# Patient Record
Sex: FEMALE | Race: WHITE | NOT HISPANIC OR LATINO | URBAN - METROPOLITAN AREA
[De-identification: names, ages, dates, MRNs, and addresses within clinical notes are randomized per-mention and may not be internally consistent; named-entity substitution may affect disease eponyms.]

---

## 2017-10-12 ENCOUNTER — OUTPATIENT (OUTPATIENT)
Dept: OUTPATIENT SERVICES | Facility: HOSPITAL | Age: 72
LOS: 1 days | Discharge: HOME | End: 2017-10-12

## 2017-10-12 DIAGNOSIS — Z12.31 ENCOUNTER FOR SCREENING MAMMOGRAM FOR MALIGNANT NEOPLASM OF BREAST: ICD-10-CM

## 2018-10-17 ENCOUNTER — OUTPATIENT (OUTPATIENT)
Dept: OUTPATIENT SERVICES | Facility: HOSPITAL | Age: 73
LOS: 1 days | Discharge: HOME | End: 2018-10-17

## 2019-03-27 ENCOUNTER — OUTPATIENT (OUTPATIENT)
Dept: OUTPATIENT SERVICES | Facility: HOSPITAL | Age: 74
LOS: 1 days | Discharge: HOME | End: 2019-03-27

## 2019-03-27 DIAGNOSIS — R52 PAIN, UNSPECIFIED: ICD-10-CM

## 2019-04-02 ENCOUNTER — OUTPATIENT (OUTPATIENT)
Dept: OUTPATIENT SERVICES | Facility: HOSPITAL | Age: 74
LOS: 1 days | Discharge: HOME | End: 2019-04-02
Payer: MEDICARE

## 2019-04-02 DIAGNOSIS — M54.2 CERVICALGIA: ICD-10-CM

## 2019-04-02 PROCEDURE — 72141 MRI NECK SPINE W/O DYE: CPT | Mod: 26

## 2019-10-21 ENCOUNTER — OUTPATIENT (OUTPATIENT)
Dept: OUTPATIENT SERVICES | Facility: HOSPITAL | Age: 74
LOS: 1 days | Discharge: HOME | End: 2019-10-21
Payer: MEDICARE

## 2019-10-21 DIAGNOSIS — Z12.31 ENCOUNTER FOR SCREENING MAMMOGRAM FOR MALIGNANT NEOPLASM OF BREAST: ICD-10-CM

## 2019-10-21 PROCEDURE — 77063 BREAST TOMOSYNTHESIS BI: CPT | Mod: 26

## 2019-10-21 PROCEDURE — 77067 SCR MAMMO BI INCL CAD: CPT | Mod: 26

## 2021-03-31 ENCOUNTER — INPATIENT (INPATIENT)
Facility: HOSPITAL | Age: 76
LOS: 3 days | Discharge: HOME HEALTH | End: 2021-04-04
Attending: STUDENT IN AN ORGANIZED HEALTH CARE EDUCATION/TRAINING PROGRAM | Admitting: STUDENT IN AN ORGANIZED HEALTH CARE EDUCATION/TRAINING PROGRAM
Payer: MEDICARE

## 2021-03-31 VITALS
RESPIRATION RATE: 17 BRPM | DIASTOLIC BLOOD PRESSURE: 79 MMHG | OXYGEN SATURATION: 99 % | SYSTOLIC BLOOD PRESSURE: 132 MMHG | TEMPERATURE: 97 F | WEIGHT: 115.96 LBS | HEART RATE: 78 BPM

## 2021-03-31 DIAGNOSIS — Z90.49 ACQUIRED ABSENCE OF OTHER SPECIFIED PARTS OF DIGESTIVE TRACT: Chronic | ICD-10-CM

## 2021-03-31 DIAGNOSIS — Z90.710 ACQUIRED ABSENCE OF BOTH CERVIX AND UTERUS: Chronic | ICD-10-CM

## 2021-03-31 LAB
ALBUMIN SERPL ELPH-MCNC: 4.7 G/DL — SIGNIFICANT CHANGE UP (ref 3.5–5.2)
ALP SERPL-CCNC: 57 U/L — SIGNIFICANT CHANGE UP (ref 30–115)
ALT FLD-CCNC: 18 U/L — SIGNIFICANT CHANGE UP (ref 0–41)
ANION GAP SERPL CALC-SCNC: 12 MMOL/L — SIGNIFICANT CHANGE UP (ref 7–14)
ANION GAP SERPL CALC-SCNC: 13 MMOL/L — SIGNIFICANT CHANGE UP (ref 7–14)
APPEARANCE UR: CLEAR — SIGNIFICANT CHANGE UP
APTT BLD: 27.9 SEC — SIGNIFICANT CHANGE UP (ref 27–39.2)
APTT BLD: 29.8 SEC — SIGNIFICANT CHANGE UP (ref 27–39.2)
AST SERPL-CCNC: 32 U/L — SIGNIFICANT CHANGE UP (ref 0–41)
BASOPHILS # BLD AUTO: 0.02 K/UL — SIGNIFICANT CHANGE UP (ref 0–0.2)
BASOPHILS NFR BLD AUTO: 0.2 % — SIGNIFICANT CHANGE UP (ref 0–1)
BILIRUB SERPL-MCNC: 1 MG/DL — SIGNIFICANT CHANGE UP (ref 0.2–1.2)
BILIRUB UR-MCNC: NEGATIVE — SIGNIFICANT CHANGE UP
BLD GP AB SCN SERPL QL: SIGNIFICANT CHANGE UP
BUN SERPL-MCNC: 16 MG/DL — SIGNIFICANT CHANGE UP (ref 10–20)
BUN SERPL-MCNC: 18 MG/DL — SIGNIFICANT CHANGE UP (ref 10–20)
CALCIUM SERPL-MCNC: 10.2 MG/DL — HIGH (ref 8.5–10.1)
CALCIUM SERPL-MCNC: 9.5 MG/DL — SIGNIFICANT CHANGE UP (ref 8.5–10.1)
CHLORIDE SERPL-SCNC: 92 MMOL/L — LOW (ref 98–110)
CHLORIDE SERPL-SCNC: 96 MMOL/L — LOW (ref 98–110)
CO2 SERPL-SCNC: 25 MMOL/L — SIGNIFICANT CHANGE UP (ref 17–32)
CO2 SERPL-SCNC: 27 MMOL/L — SIGNIFICANT CHANGE UP (ref 17–32)
COLOR SPEC: SIGNIFICANT CHANGE UP
CREAT SERPL-MCNC: 0.8 MG/DL — SIGNIFICANT CHANGE UP (ref 0.7–1.5)
CREAT SERPL-MCNC: 0.9 MG/DL — SIGNIFICANT CHANGE UP (ref 0.7–1.5)
DIFF PNL FLD: SIGNIFICANT CHANGE UP
EOSINOPHIL # BLD AUTO: 0 K/UL — SIGNIFICANT CHANGE UP (ref 0–0.7)
EOSINOPHIL NFR BLD AUTO: 0 % — SIGNIFICANT CHANGE UP (ref 0–8)
GLUCOSE SERPL-MCNC: 108 MG/DL — HIGH (ref 70–99)
GLUCOSE SERPL-MCNC: 118 MG/DL — HIGH (ref 70–99)
GLUCOSE UR QL: NEGATIVE — SIGNIFICANT CHANGE UP
HCT VFR BLD CALC: 41.3 % — SIGNIFICANT CHANGE UP (ref 37–47)
HCT VFR BLD CALC: 43.7 % — SIGNIFICANT CHANGE UP (ref 37–47)
HGB BLD-MCNC: 14.2 G/DL — SIGNIFICANT CHANGE UP (ref 12–16)
HGB BLD-MCNC: 14.9 G/DL — SIGNIFICANT CHANGE UP (ref 12–16)
IMM GRANULOCYTES NFR BLD AUTO: 0.3 % — SIGNIFICANT CHANGE UP (ref 0.1–0.3)
INR BLD: 1.04 RATIO — SIGNIFICANT CHANGE UP (ref 0.65–1.3)
INR BLD: 1.08 RATIO — SIGNIFICANT CHANGE UP (ref 0.65–1.3)
KETONES UR-MCNC: SIGNIFICANT CHANGE UP
LACTATE SERPL-SCNC: 1.7 MMOL/L — SIGNIFICANT CHANGE UP (ref 0.7–2)
LACTATE SERPL-SCNC: 1.9 MMOL/L — SIGNIFICANT CHANGE UP (ref 0.7–2)
LEUKOCYTE ESTERASE UR-ACNC: NEGATIVE — SIGNIFICANT CHANGE UP
LIDOCAIN IGE QN: 19 U/L — SIGNIFICANT CHANGE UP (ref 7–60)
LYMPHOCYTES # BLD AUTO: 1.08 K/UL — LOW (ref 1.2–3.4)
LYMPHOCYTES # BLD AUTO: 10.1 % — LOW (ref 20.5–51.1)
MAGNESIUM SERPL-MCNC: 1.7 MG/DL — LOW (ref 1.8–2.4)
MCHC RBC-ENTMCNC: 31.6 PG — HIGH (ref 27–31)
MCHC RBC-ENTMCNC: 32 PG — HIGH (ref 27–31)
MCHC RBC-ENTMCNC: 34.1 G/DL — SIGNIFICANT CHANGE UP (ref 32–37)
MCHC RBC-ENTMCNC: 34.4 G/DL — SIGNIFICANT CHANGE UP (ref 32–37)
MCV RBC AUTO: 92.6 FL — SIGNIFICANT CHANGE UP (ref 81–99)
MCV RBC AUTO: 93 FL — SIGNIFICANT CHANGE UP (ref 81–99)
MONOCYTES # BLD AUTO: 0.62 K/UL — HIGH (ref 0.1–0.6)
MONOCYTES NFR BLD AUTO: 5.8 % — SIGNIFICANT CHANGE UP (ref 1.7–9.3)
NEUTROPHILS # BLD AUTO: 8.96 K/UL — HIGH (ref 1.4–6.5)
NEUTROPHILS NFR BLD AUTO: 83.6 % — HIGH (ref 42.2–75.2)
NITRITE UR-MCNC: NEGATIVE — SIGNIFICANT CHANGE UP
NRBC # BLD: 0 /100 WBCS — SIGNIFICANT CHANGE UP (ref 0–0)
NRBC # BLD: 0 /100 WBCS — SIGNIFICANT CHANGE UP (ref 0–0)
PH UR: 6.5 — SIGNIFICANT CHANGE UP (ref 5–8)
PHOSPHATE SERPL-MCNC: 3.8 MG/DL — SIGNIFICANT CHANGE UP (ref 2.1–4.9)
PLATELET # BLD AUTO: 280 K/UL — SIGNIFICANT CHANGE UP (ref 130–400)
PLATELET # BLD AUTO: 300 K/UL — SIGNIFICANT CHANGE UP (ref 130–400)
POTASSIUM SERPL-MCNC: 4.4 MMOL/L — SIGNIFICANT CHANGE UP (ref 3.5–5)
POTASSIUM SERPL-MCNC: 4.8 MMOL/L — SIGNIFICANT CHANGE UP (ref 3.5–5)
POTASSIUM SERPL-SCNC: 4.4 MMOL/L — SIGNIFICANT CHANGE UP (ref 3.5–5)
POTASSIUM SERPL-SCNC: 4.8 MMOL/L — SIGNIFICANT CHANGE UP (ref 3.5–5)
PROT SERPL-MCNC: 7.5 G/DL — SIGNIFICANT CHANGE UP (ref 6–8)
PROT UR-MCNC: NEGATIVE — SIGNIFICANT CHANGE UP
PROTHROM AB SERPL-ACNC: 12 SEC — SIGNIFICANT CHANGE UP (ref 9.95–12.87)
PROTHROM AB SERPL-ACNC: 12.4 SEC — SIGNIFICANT CHANGE UP (ref 9.95–12.87)
RBC # BLD: 4.44 M/UL — SIGNIFICANT CHANGE UP (ref 4.2–5.4)
RBC # BLD: 4.72 M/UL — SIGNIFICANT CHANGE UP (ref 4.2–5.4)
RBC # FLD: 12 % — SIGNIFICANT CHANGE UP (ref 11.5–14.5)
RBC # FLD: 12.2 % — SIGNIFICANT CHANGE UP (ref 11.5–14.5)
SARS-COV-2 RNA SPEC QL NAA+PROBE: SIGNIFICANT CHANGE UP
SODIUM SERPL-SCNC: 131 MMOL/L — LOW (ref 135–146)
SODIUM SERPL-SCNC: 134 MMOL/L — LOW (ref 135–146)
SP GR SPEC: 1.01 — SIGNIFICANT CHANGE UP (ref 1.01–1.03)
UROBILINOGEN FLD QL: SIGNIFICANT CHANGE UP
WBC # BLD: 10.71 K/UL — SIGNIFICANT CHANGE UP (ref 4.8–10.8)
WBC # BLD: 9.98 K/UL — SIGNIFICANT CHANGE UP (ref 4.8–10.8)
WBC # FLD AUTO: 10.71 K/UL — SIGNIFICANT CHANGE UP (ref 4.8–10.8)
WBC # FLD AUTO: 9.98 K/UL — SIGNIFICANT CHANGE UP (ref 4.8–10.8)

## 2021-03-31 PROCEDURE — 71045 X-RAY EXAM CHEST 1 VIEW: CPT | Mod: 26,77

## 2021-03-31 PROCEDURE — 99024 POSTOP FOLLOW-UP VISIT: CPT

## 2021-03-31 PROCEDURE — 71045 X-RAY EXAM CHEST 1 VIEW: CPT | Mod: 26

## 2021-03-31 PROCEDURE — 99285 EMERGENCY DEPT VISIT HI MDM: CPT | Mod: CS

## 2021-03-31 PROCEDURE — 99223 1ST HOSP IP/OBS HIGH 75: CPT | Mod: 57

## 2021-03-31 PROCEDURE — 44005 FREEING OF BOWEL ADHESION: CPT

## 2021-03-31 PROCEDURE — 74177 CT ABD & PELVIS W/CONTRAST: CPT | Mod: 26,MH

## 2021-03-31 PROCEDURE — 93010 ELECTROCARDIOGRAM REPORT: CPT

## 2021-03-31 RX ORDER — BENZOCAINE 10 %
1 GEL (GRAM) MUCOUS MEMBRANE EVERY 6 HOURS
Refills: 0 | Status: DISCONTINUED | OUTPATIENT
Start: 2021-03-31 | End: 2021-04-04

## 2021-03-31 RX ORDER — SODIUM CHLORIDE 9 MG/ML
1000 INJECTION INTRAMUSCULAR; INTRAVENOUS; SUBCUTANEOUS
Refills: 0 | Status: DISCONTINUED | OUTPATIENT
Start: 2021-03-31 | End: 2021-04-02

## 2021-03-31 RX ORDER — LEVOTHYROXINE SODIUM 125 MCG
0 TABLET ORAL
Qty: 0 | Refills: 0 | DISCHARGE

## 2021-03-31 RX ORDER — ONDANSETRON 8 MG/1
4 TABLET, FILM COATED ORAL ONCE
Refills: 0 | Status: COMPLETED | OUTPATIENT
Start: 2021-03-31 | End: 2021-03-31

## 2021-03-31 RX ORDER — KETOROLAC TROMETHAMINE 30 MG/ML
15 SYRINGE (ML) INJECTION EVERY 8 HOURS
Refills: 0 | Status: DISCONTINUED | OUTPATIENT
Start: 2021-03-31 | End: 2021-04-04

## 2021-03-31 RX ORDER — ACETAMINOPHEN 500 MG
1000 TABLET ORAL ONCE
Refills: 0 | Status: COMPLETED | OUTPATIENT
Start: 2021-03-31 | End: 2021-04-01

## 2021-03-31 RX ORDER — LEVOTHYROXINE SODIUM 125 MCG
44 TABLET ORAL AT BEDTIME
Refills: 0 | Status: DISCONTINUED | OUTPATIENT
Start: 2021-03-31 | End: 2021-04-03

## 2021-03-31 RX ORDER — IOHEXOL 300 MG/ML
30 INJECTION, SOLUTION INTRAVENOUS ONCE
Refills: 0 | Status: COMPLETED | OUTPATIENT
Start: 2021-03-31 | End: 2021-03-31

## 2021-03-31 RX ORDER — SODIUM CHLORIDE 9 MG/ML
1000 INJECTION, SOLUTION INTRAVENOUS ONCE
Refills: 0 | Status: COMPLETED | OUTPATIENT
Start: 2021-03-31 | End: 2021-03-31

## 2021-03-31 RX ORDER — PANTOPRAZOLE SODIUM 20 MG/1
40 TABLET, DELAYED RELEASE ORAL DAILY
Refills: 0 | Status: DISCONTINUED | OUTPATIENT
Start: 2021-03-31 | End: 2021-04-04

## 2021-03-31 RX ORDER — BENZOCAINE 10 %
1 GEL (GRAM) MUCOUS MEMBRANE EVERY 6 HOURS
Refills: 0 | Status: DISCONTINUED | OUTPATIENT
Start: 2021-03-31 | End: 2021-03-31

## 2021-03-31 RX ORDER — HEPARIN SODIUM 5000 [USP'U]/ML
5000 INJECTION INTRAVENOUS; SUBCUTANEOUS EVERY 8 HOURS
Refills: 0 | Status: DISCONTINUED | OUTPATIENT
Start: 2021-03-31 | End: 2021-04-04

## 2021-03-31 RX ORDER — MORPHINE SULFATE 50 MG/1
4 CAPSULE, EXTENDED RELEASE ORAL ONCE
Refills: 0 | Status: DISCONTINUED | OUTPATIENT
Start: 2021-03-31 | End: 2021-03-31

## 2021-03-31 RX ADMIN — IOHEXOL 30 MILLILITER(S): 300 INJECTION, SOLUTION INTRAVENOUS at 12:22

## 2021-03-31 RX ADMIN — SODIUM CHLORIDE 1000 MILLILITER(S): 9 INJECTION, SOLUTION INTRAVENOUS at 19:34

## 2021-03-31 RX ADMIN — ONDANSETRON 4 MILLIGRAM(S): 8 TABLET, FILM COATED ORAL at 12:22

## 2021-03-31 RX ADMIN — SODIUM CHLORIDE 1000 MILLILITER(S): 9 INJECTION, SOLUTION INTRAVENOUS at 12:22

## 2021-03-31 RX ADMIN — MORPHINE SULFATE 4 MILLIGRAM(S): 50 CAPSULE, EXTENDED RELEASE ORAL at 12:22

## 2021-03-31 NOTE — ED PROVIDER NOTE - CLINICAL SUMMARY MEDICAL DECISION MAKING FREE TEXT BOX
75F with PMH open appendectomy, open cholecystectomy, hypothyroidism who p/w gradual onset diffuse abd pain, nausea, and bloating x3 days. Initially she had vomiting nbnb on day 1, which resolved. Last BM 2d ago, last flatus 2d ago. On arrival pt afebrile, not ill appearing, Labs and imaging reviewed. IVF, zofran given. CT shows high grade obstruction with TP in the pelvis. Family updated at the bedside, surgery team evaluated in the ED and will take pt to the OR- covid swab and pre-op labs all sent. her GI doctor is Dr. Carlos Perez and would like updates on patient's status.

## 2021-03-31 NOTE — H&P ADULT - NSHPPHYSICALEXAM_GEN_ALL_CORE
--------------------------------------------------------------------------------------    VITAL SIGNS, INS/OUTS (last 24 hours):  --------------------------------------------------------------------------------------  ICU Vital Signs Last 24 Hrs  T(C): 36.1 (31 Mar 2021 11:21), Max: 36.1 (31 Mar 2021 11:21)  T(F): 96.9 (31 Mar 2021 11:21), Max: 96.9 (31 Mar 2021 11:21)  HR: 78 (31 Mar 2021 11:21) (78 - 78)  BP: 132/79 (31 Mar 2021 11:21) (132/79 - 132/79)  BP(mean): --  ABP: --  ABP(mean): --  RR: 17 (31 Mar 2021 11:21) (17 - 17)  SpO2: 99% (31 Mar 2021 11:21) (99% - 99%)    I&O's Summary    --------------------------------------------------------------------------------------  PHYSICAL EXAM  General: NAD, AAOx3, calm and cooperative  HEENT: NCAT, TESS, EOMI, Trachea ML, Neck supple  Cardiac: RRR S1, S2, no Murmurs, rubs or gallops  Respiratory: CTAB, normal respiratory effort  Abdomen: Soft, +distended, +Bilateral lower quadrant tenderness, no guarding or rebound, multiple healed surgical scars  Musculoskeletal: Strength 5/5 BL UE/LE, ROM intact, compartments soft  Neuro: Sensation grossly intact and equal throughout  Skin: Warm/dry, normal color, no jaundice

## 2021-03-31 NOTE — H&P ADULT - NSHPLABSRESULTS_GEN_ALL_CORE
--------------------------------------------------------------------------------------  Labs:  CAPILLARY BLOOD GLUCOSE                              14.9   10.71 )-----------( 300      ( 31 Mar 2021 12:20 )             43.7       75y      131<L>  |  92<L>  |  18  ----------------------------<  118<H>  4.4   |  27  |  0.8      Calcium, Total Serum: 10.2 mg/dL (21 @ 12:20)      LFTs:             7.5  | 1.0  | 32       ------------------[57      ( 31 Mar 2021 12:20 )  4.7  | x    | 18          Lipase:19     Amylase:x         Female    Coags:     12.40  ----< 1.08    ( 31 Mar 2021 12:20 )     27.9            Urinalysis Basic - ( 31 Mar 2021 13:15 )    Color: Light Yellow / Appearance: Clear / S.009 / pH: x  Gluc: x / Ketone: Trace  / Bili: Negative / Urobili: <2 mg/dL   Blood: x / Protein: Negative / Nitrite: Negative   Leuk Esterase: Negative / RBC: x / WBC x   Sq Epi: x / Non Sq Epi: x / Bacteria: x        --------------------------------------------------------------------------------------  RADIOLOGY & ADDITIONAL STUDIES:  < from: CT Abdomen and Pelvis w/ Oral Cont and w/ IV Cont (21 @ 15:00) >    FINDINGS:    LOWER CHEST: Left lower lobe bronchiectasis with numerous associated nodules/areas of nodularity.    HEPATOBILIARY: Postcholecystectomy. Intrahepatic and extrahepatic biliary ductal dilation. Common bile duct measures up to 1.2 cm. Findings may be seen post cholecystectomy.    SPLEEN: Unremarkable.    PANCREAS: Unremarkable.    ADRENAL GLANDS: Unremarkable.    KIDNEYS: Indeterminate 2.1 hypoattenuating right upper pole renal lesion measuring higher than fluid attenuation. Lateral symmetric renal enhancement. No hydronephrosis.    ABDOMINOPELVIC NODES: Unremarkable.    PELVIC ORGANS: Status post hysterectomy.    PERITONEUM/MESENTERY/BOWEL: Diffuse small bowel dilatation with transition point in the pelvis (series 4 images 265), compatible with small bowel obstruction. There is slight associated mesenteric swirling in the region of the transition point raising possibility of high-grade small bowel obstruction. More distal small bowel is decompressed. The colon is decompressed. Oral contrast is present in proximal small bowel. Generalized small ascites and mesenteric edema. No definitive free air. Colonic diverticulosis.      BONES/SOFT TISSUES: Status post right hip arthroplasty. Osteopenia. Degenerative changes of the spine and left hip.  Bilateral L5 pars defects. Fluid attenuation of the left iliopsoas (601/20) suggestive of iliopsoas bursitis.    OTHER: Atherosclerotic calcifications.      IMPRESSION:    1.  Small bowel obstruction with transition point in the pelvis. Slight associated mesenteric swirling raising possibility of high-grade obstruction.  2.  Small ascites and mesenteric edema.  3.  Indeterminate 2.1 cm right upper pole renal lesion. Recommend further evaluation with outpatient MRI to differentiate between complex cyst and solid lesion.  4.  Left lower lobe bronchiectasis with numerous associated nodules/areas of nodularity. While findings may be postinfectious/postinflammatory, dedicated chest CT follow up in 3 months is recommended.    < end of copied text >

## 2021-03-31 NOTE — H&P ADULT - ASSESSMENT
ASSESSMENT:  75 year old female with a past medical history of hypothroidism, past surgical history of oophrectomy, open appendectomy, open cholecystectomy, ex-lap PORSCHE-BSO, presented to the ED with abdominal pain. She states that her pain started 2 days ago, associated with nausea, and vomiting x1. Her pain was intermittent, progressing to constant generalized abdominal pain. She denied any relieving factors. She has never been diagnosed with am SBO in the past, not has she experienced similar pain prior. Her last bowel movement was 2 days ago, and her last flatus was 2 days ago as well. Similarly, she has not been able to tolerate PO for 2 days. She called her GI today, (Dr. Perez) who advised her to come to the ED. Her last c-scope was approximately 5 years ago, negative per patient. She denied fever, chills, chest pain, palpitations, SOB, wheezing, diarrhea, dysuria.  In ED, wbc 10.7 with a left shift. Lactate 1.7. CT findings consistent with high grade SBO with a transition point in the pelvis.       PLAN:   Admit to surgery  NPO, NGT placed with 500 bilious output  Plan for repeat CT with po contrast in morning  Serial exams, discussed with patient that if her pain worsens, or does not get better, she will need operative exploration  IVF  Strict I/O  IV synthroid  DVT/GI ppx      Above plan discussed with Dr. Page, patient, and TAP team    --------------------------------------------------------------------------------------    03-31-21 @ 19:16

## 2021-03-31 NOTE — ED PROVIDER NOTE - PROGRESS NOTE DETAILS
FERMIN: call from radiology, CT shows high grade SBO with swirling and +transition point in the pelvis. surgery and pt aware. FERMIN: surgery placed NGT, requesting admission to their service.

## 2021-03-31 NOTE — ED PROVIDER NOTE - OBJECTIVE STATEMENT
75 year old female with hx of hypothyroidism, PORSCHE, appendectomy, cholecystectomy presents to the ED for evaluation of gradual onset constant generalized abdominal pain, bloating, and nausea x 3 days. Pt also admits to decreased po intake after vomiting all day the day of onset, now just with severe nausea. Denies prior hx of similar symptoms. Denies fevers/chills, chest pain, sob, diarrhea, constipation, obstipation, back/flank pain, irritative voiding symptoms, black/bloody stools, recent travel/sick contacts/antibiotics.

## 2021-03-31 NOTE — ED PROVIDER NOTE - ATTENDING CONTRIBUTION TO CARE
75F with PMH appendectomy, cholecystectomy, hypothyroidism who p/w gradual onset diffuse abd pain, nausea, and bloating x3 days. Initially she had vomiting nbnb on day 1, which resolved. Currently she feels bloated and feels nauseous. Denies fevers, chills, vomiting, urinary sx.     Gen - NAD, Head - NCAT, Pharynx - clear, MMM, Heart - RRR, no m/g/r, Lungs - CTAB, no w/c/r, Abdomen -  moderately distended with hypoactive bs, no guarding or rebound tenderness, Skin - No rash, Extremities - FROM, no edema, erythema, ecchymosis, brisk cap refill,  nl strength and sensation, nl gait.    a/p: will eval for sbo, colitis, diverticulitis, and will obtain labs, ct a/p po/iv contrast, will give ivf, analgesia, zofran and reassess. 75F with PMH open appendectomy, open cholecystectomy, hypothyroidism who p/w gradual onset diffuse abd pain, nausea, and bloating x3 days. Initially she had vomiting nbnb on day 1, which resolved. Last BM 2d ago, last flatus 2d ago. Currently she feels bloated and feels nauseous. Denies fevers, chills, vomiting, urinary sx.     Gen - NAD, Head - NCAT, Pharynx - clear, MMM, Heart - RRR, no m/g/r, Lungs - CTAB, no w/c/r, Abdomen -  moderately distended with hypoactive bs, no guarding or rebound tenderness, Skin - No rash, Extremities - FROM, no edema, erythema, ecchymosis, brisk cap refill,  nl strength and sensation, nl gait.    a/p: will eval for sbo, colitis, diverticulitis, and will obtain labs, ct a/p po/iv contrast, will give ivf, analgesia, zofran and reassess.

## 2021-03-31 NOTE — H&P ADULT - NSICDXPASTSURGICALHX_GEN_ALL_CORE_FT
PAST SURGICAL HISTORY:  S/P appendectomy     S/P cholecystectomy     S/P total abdominal hysterectomy and bilateral salpingo-oophorectomy

## 2021-03-31 NOTE — PRE-ANESTHESIA EVALUATION ADULT - NSANTHASARD_GEN_ALL_CORE
OT updated by PT that pt refuses activity at this time due to wanting to rest prior to procedure this afternoon.  Will attempt later as able.   3E

## 2021-03-31 NOTE — ED ADULT NURSE NOTE - INTERVENTIONS DEFINITIONS
[place] to call system/Physically safe environment: no spills, clutter or unnecessary equipment/Stretcher in lowest position, wheels locked, appropriate side rails in place

## 2021-03-31 NOTE — H&P ADULT - ATTENDING COMMENTS
75 year old female with a past medical history of hypothroidism, past surgical history of oophorectomy, open appendectomy, open cholecystectomy, PORSCHE-BSO, presented to the ED with abdominal pain. She states that her pain started 2 days ago, associated with nausea, and vomiting x1. H Her last c-scope was approximately 5 years ago, negative per patient.   In ED, wbc 10.7 with a left shift. Lactate 1.7. CT findings consistent with high grade SBO with a transition point in the pelvis. NGT placed 500cc dark bilious material. ABD- soft, distended, ttp bilateral lower quadrants, +rebound tenderness.     I had a long discussion with the patient and her family ( , daughter, son) about the risks, benefits, and alternatives to surgery. Due to her concerning CT findings and persistent and worsening abdominal pain my recommendation was to take the patient to the operating room.  I explained that it was possible we could have to resect bowel with possible ostomy depending on the intraoperative findings. The patient and her family agreed to surgery.. All questions were answered.

## 2021-03-31 NOTE — H&P ADULT - HISTORY OF PRESENT ILLNESS
HPI:  75 year old female with a past medical history of hypothroidism, past surgical history of oophrectomy, open appendectomy, open cholecystectomy, ex-lap PORSCHE-BSO, presented to the ED with abdominal pain. She states that her pain started 2 days ago, associated with nausea, and vomiting x1. Her pain was intermittent, progressing to constant generalized abdominal pain. She denied any relieving factors. She has never been diagnosed with am SBO in the past, not has she experienced similar pain prior. Her last bowel movement was 2 days ago, and her last flatus was 2 days ago as well. Similarly, she has not been able to tolerate PO for 2 days. She called her GI today, (Dr. Perez) who advised her to come to the ED. Her last c-scope was approximately 5 years ago, negative per patient. She denied fever, chills, chest pain, palpitations, SOB, wheezing, diarrhea, dysuria.  In ED, wbc 10.7 with a left shift. Lactate 1.7. CT findings consistent with high grade SBO with a transition point in the pelvis. NGT was placed with 500cc bilious output and improvement of symptoms.

## 2021-03-31 NOTE — ED PROVIDER NOTE - PHYSICAL EXAMINATION
VITALS:  I have reviewed the initial vital signs.  GENERAL: Well-developed, well-nourished, in no acute distress. Nontoxic.  HEENT: Sclera clear. No conjunctival pallor. EOMI, PERRLA. MMM.   NECK: supple w FROM.   CARDIO: RRR, nl S1 and S2. No murmurs, rubs, or gallops.   PULM: Normal effort. CTA b/l without wheezes, rales, or rhonchi.  MSK: Normal, steady gait.  GI: Normal bowel sounds. Abdomen mildly distended. Diffuse ttp, no rebound or guarding.  : No CVA tenderness b/l.  SKIN: Warm, dry. No pallor. No rash. No jaundice.   NEURO: A&Ox3. Speech clear. No focal deficits.   PSYCH: Calm and cooperative.

## 2021-03-31 NOTE — ED PROVIDER NOTE - NS ED ROS FT
CONSTITUTIONAL: (-) fevers, (-) chills, (-) malaise, (+) decreased appetite  CARDIO: (-) chest pain, (-) palpitations, (-) edema  PULM: (-) cough, (-) sputum, (-) chest tightness, (-) shortness of breath  GI: see HPI  : (-) dysuria, (-) hematuria, (-) frequency, (-) urgency, (-) flank pain  ENDO: (-) polyuria, (-) polydipsia, (-) polyphagia  HEME: (-) easy bruising, (-) easy bleeding  MSK: (-) back pain, (-) myalgias, (-) gait difficulty  SKIN: (-) rashes, (-) pallor, (-) jaundice  NEURO: (-) headache, (-) dizziness, (-) lightheadedness, (-) weakness, (-) syncope    *all other systems negative except as documented above and in the HPI*

## 2021-03-31 NOTE — H&P ADULT - NSHPREVIEWOFSYSTEMS_GEN_ALL_CORE
ROS:    REVIEW OF SYSTEMS    [ ] A ten-point review of systems was otherwise negative except as noted.  [ ] Due to altered mental status/intubation, subjective information were not able to be obtained from the patient. History was obtained, to the extent possible, from review of the chart and collateral sources of information.

## 2021-04-01 LAB
ANION GAP SERPL CALC-SCNC: 10 MMOL/L — SIGNIFICANT CHANGE UP (ref 7–14)
BUN SERPL-MCNC: 14 MG/DL — SIGNIFICANT CHANGE UP (ref 10–20)
CALCIUM SERPL-MCNC: 8.2 MG/DL — LOW (ref 8.5–10.1)
CHLORIDE SERPL-SCNC: 106 MMOL/L — SIGNIFICANT CHANGE UP (ref 98–110)
CO2 SERPL-SCNC: 22 MMOL/L — SIGNIFICANT CHANGE UP (ref 17–32)
CREAT SERPL-MCNC: 0.9 MG/DL — SIGNIFICANT CHANGE UP (ref 0.7–1.5)
GLUCOSE SERPL-MCNC: 90 MG/DL — SIGNIFICANT CHANGE UP (ref 70–99)
HCT VFR BLD CALC: 38.9 % — SIGNIFICANT CHANGE UP (ref 37–47)
HGB BLD-MCNC: 13.2 G/DL — SIGNIFICANT CHANGE UP (ref 12–16)
MAGNESIUM SERPL-MCNC: 1.9 MG/DL — SIGNIFICANT CHANGE UP (ref 1.8–2.4)
MCHC RBC-ENTMCNC: 32.1 PG — HIGH (ref 27–31)
MCHC RBC-ENTMCNC: 33.9 G/DL — SIGNIFICANT CHANGE UP (ref 32–37)
MCV RBC AUTO: 94.6 FL — SIGNIFICANT CHANGE UP (ref 81–99)
NRBC # BLD: 0 /100 WBCS — SIGNIFICANT CHANGE UP (ref 0–0)
PHOSPHATE SERPL-MCNC: 2.7 MG/DL — SIGNIFICANT CHANGE UP (ref 2.1–4.9)
PLATELET # BLD AUTO: 212 K/UL — SIGNIFICANT CHANGE UP (ref 130–400)
POTASSIUM SERPL-MCNC: 4 MMOL/L — SIGNIFICANT CHANGE UP (ref 3.5–5)
POTASSIUM SERPL-SCNC: 4 MMOL/L — SIGNIFICANT CHANGE UP (ref 3.5–5)
RBC # BLD: 4.11 M/UL — LOW (ref 4.2–5.4)
RBC # FLD: 12.2 % — SIGNIFICANT CHANGE UP (ref 11.5–14.5)
SODIUM SERPL-SCNC: 138 MMOL/L — SIGNIFICANT CHANGE UP (ref 135–146)
WBC # BLD: 8.46 K/UL — SIGNIFICANT CHANGE UP (ref 4.8–10.8)
WBC # FLD AUTO: 8.46 K/UL — SIGNIFICANT CHANGE UP (ref 4.8–10.8)

## 2021-04-01 PROCEDURE — 44005 FREEING OF BOWEL ADHESION: CPT

## 2021-04-01 RX ORDER — SODIUM CHLORIDE 9 MG/ML
1000 INJECTION, SOLUTION INTRAVENOUS
Refills: 0 | Status: DISCONTINUED | OUTPATIENT
Start: 2021-04-01 | End: 2021-04-01

## 2021-04-01 RX ORDER — OXYCODONE AND ACETAMINOPHEN 5; 325 MG/1; MG/1
1 TABLET ORAL ONCE
Refills: 0 | Status: DISCONTINUED | OUTPATIENT
Start: 2021-04-01 | End: 2021-04-01

## 2021-04-01 RX ORDER — ONDANSETRON 8 MG/1
4 TABLET, FILM COATED ORAL ONCE
Refills: 0 | Status: DISCONTINUED | OUTPATIENT
Start: 2021-04-01 | End: 2021-04-01

## 2021-04-01 RX ORDER — MORPHINE SULFATE 50 MG/1
2 CAPSULE, EXTENDED RELEASE ORAL
Refills: 0 | Status: DISCONTINUED | OUTPATIENT
Start: 2021-04-01 | End: 2021-04-01

## 2021-04-01 RX ORDER — HYDROMORPHONE HYDROCHLORIDE 2 MG/ML
0.5 INJECTION INTRAMUSCULAR; INTRAVENOUS; SUBCUTANEOUS
Refills: 0 | Status: DISCONTINUED | OUTPATIENT
Start: 2021-04-01 | End: 2021-04-01

## 2021-04-01 RX ORDER — CEFOTETAN DISODIUM 1 G
1 VIAL (EA) INJECTION EVERY 12 HOURS
Refills: 0 | Status: DISCONTINUED | OUTPATIENT
Start: 2021-04-01 | End: 2021-04-04

## 2021-04-01 RX ORDER — MAGNESIUM SULFATE 500 MG/ML
1 VIAL (ML) INJECTION ONCE
Refills: 0 | Status: COMPLETED | OUTPATIENT
Start: 2021-04-01 | End: 2021-04-01

## 2021-04-01 RX ORDER — SODIUM CHLORIDE 9 MG/ML
500 INJECTION, SOLUTION INTRAVENOUS ONCE
Refills: 0 | Status: COMPLETED | OUTPATIENT
Start: 2021-04-01 | End: 2021-04-01

## 2021-04-01 RX ADMIN — Medication 100 GRAM(S): at 17:46

## 2021-04-01 RX ADMIN — Medication 15 MILLIGRAM(S): at 13:39

## 2021-04-01 RX ADMIN — Medication 15 MILLIGRAM(S): at 23:18

## 2021-04-01 RX ADMIN — PANTOPRAZOLE SODIUM 40 MILLIGRAM(S): 20 TABLET, DELAYED RELEASE ORAL at 17:46

## 2021-04-01 RX ADMIN — Medication 100 GRAM(S): at 02:04

## 2021-04-01 RX ADMIN — Medication 15 MILLIGRAM(S): at 01:44

## 2021-04-01 RX ADMIN — Medication 15 MILLIGRAM(S): at 02:01

## 2021-04-01 RX ADMIN — HEPARIN SODIUM 5000 UNIT(S): 5000 INJECTION INTRAVENOUS; SUBCUTANEOUS at 21:44

## 2021-04-01 RX ADMIN — SODIUM CHLORIDE 100 MILLILITER(S): 9 INJECTION INTRAMUSCULAR; INTRAVENOUS; SUBCUTANEOUS at 02:01

## 2021-04-01 RX ADMIN — Medication 1000 MILLIGRAM(S): at 18:50

## 2021-04-01 RX ADMIN — Medication 44 MICROGRAM(S): at 21:43

## 2021-04-01 RX ADMIN — HYDROMORPHONE HYDROCHLORIDE 0.5 MILLIGRAM(S): 2 INJECTION INTRAMUSCULAR; INTRAVENOUS; SUBCUTANEOUS at 01:41

## 2021-04-01 RX ADMIN — HYDROMORPHONE HYDROCHLORIDE 0.5 MILLIGRAM(S): 2 INJECTION INTRAMUSCULAR; INTRAVENOUS; SUBCUTANEOUS at 01:30

## 2021-04-01 RX ADMIN — HEPARIN SODIUM 5000 UNIT(S): 5000 INJECTION INTRAVENOUS; SUBCUTANEOUS at 05:56

## 2021-04-01 RX ADMIN — HYDROMORPHONE HYDROCHLORIDE 0.5 MILLIGRAM(S): 2 INJECTION INTRAMUSCULAR; INTRAVENOUS; SUBCUTANEOUS at 02:08

## 2021-04-01 RX ADMIN — Medication 100 GRAM(S): at 05:56

## 2021-04-01 RX ADMIN — SODIUM CHLORIDE 100 MILLILITER(S): 9 INJECTION INTRAMUSCULAR; INTRAVENOUS; SUBCUTANEOUS at 10:36

## 2021-04-01 RX ADMIN — Medication 400 MILLIGRAM(S): at 18:19

## 2021-04-01 RX ADMIN — SODIUM CHLORIDE 250 MILLILITER(S): 9 INJECTION, SOLUTION INTRAVENOUS at 08:57

## 2021-04-01 RX ADMIN — HEPARIN SODIUM 5000 UNIT(S): 5000 INJECTION INTRAVENOUS; SUBCUTANEOUS at 13:00

## 2021-04-01 NOTE — PHYSICAL THERAPY INITIAL EVALUATION ADULT - GENERAL OBSERVATIONS, REHAB EVAL
10;00-10:35.  Chart reviewed. Pt. in bed. + Abdom binder, + Aranda, + NGT,+ IV.  SPO2 98% on room air.  A & O x 4, agreeable to P.T.    P.T. Eval/assessm. done. See note. Pt. steven. well. Ret. to ly in bed after tx. Informed RN Agata.

## 2021-04-01 NOTE — CHART NOTE - NSCHARTNOTEFT_GEN_A_CORE
Called to pt's bedside by RN this afternoon due to incisional pain, redness and swelling. Pt seen and examined with Tobin Perry, PGY-3. She reports that after completing a session with PT this am she returned to bed and felt sudden onset of pain and swelling at the lower aspect of her incision. Pt reports she had been having very little pain prior to this. Abdominal exam shows the midline dressing to be intact with mild-moderate serosanguinous staining. Small area of periumbilical ecchymosis as well as a larger area of ecchymosis over the lower aspect of the dressing with overlying puffiness. No induration. No warmth. No active drainage. Suspect  vessel bled and has now self tamponaded. Abdominal binder applied snuggly to assist with tamponade. Will continue to monitor closely.
PACU ANESTHESIA ADMISSION NOTE      ____ Intubated  TV:______       Rate: ______      FiO2: ______    _x___ Patent Airway    _x___ Full return of protective reflexes    ____ Full recovery from anesthesia / sedation to baseline status    Vitals:  HR 91  /75  RR 16  O2sat. 100%  Temp: 36.7C      Mental Status:  __x__ Awake   x_____ Alert   _____ Drowsy   _____ Sedated    Nausea/Vomiting: ____ Yes, See Post - Op Orders      ___x_ No    Pain Scale (0-10): _____    Treatment: _x___ None    ___x_ See Post - Op/PCA Orders    Post - Operative Fluids:   ____ Oral   __x__ See Post - Op Orders    Plan:  Discharge to:   ____Home       ___x__Floor      _____Critical Care    _____ Other:_________________    Comments: s/p general anesthesia with ETT. No anesthesia complications. Pt's condition is stable in PACU. Full report is given to PACU RN.

## 2021-04-01 NOTE — PROGRESS NOTE ADULT - ATTENDING COMMENTS
75yFemalePOD#0 for Diagnostic laparoscopy with exploratory laparotomy for high grade sbo  Lysis of intestinal adhesions    patient doing well post op. minimal pain. keep del cid in place, NPO/ NGT LIS, IVF, strict i/o, encourage IS, PT eval

## 2021-04-01 NOTE — PHYSICAL THERAPY INITIAL EVALUATION ADULT - GAIT DISTANCE, PT EVAL
limited by all the attached lines that can't come out now as per RN/bed to chair/chair to bed/5 feet

## 2021-04-01 NOTE — BRIEF OPERATIVE NOTE - NSICDXBRIEFPROCEDURE_GEN_ALL_CORE_FT
PROCEDURES:  Diagnostic laparoscopy with exploratory laparotomy 01-Apr-2021 01:04:28  Sudheer Cross  Lysis of intestinal adhesions 01-Apr-2021 01:04:37  Sudheer Cross

## 2021-04-01 NOTE — BRIEF OPERATIVE NOTE - OPERATION/FINDINGS
Multiple loops of dilated small bowel, Serous  free fluid, Point of obstruction identified lysis of adhesives bands.

## 2021-04-02 LAB
ANION GAP SERPL CALC-SCNC: 14 MMOL/L — SIGNIFICANT CHANGE UP (ref 7–14)
BASOPHILS # BLD AUTO: 0.03 K/UL — SIGNIFICANT CHANGE UP (ref 0–0.2)
BASOPHILS NFR BLD AUTO: 0.4 % — SIGNIFICANT CHANGE UP (ref 0–1)
BUN SERPL-MCNC: 18 MG/DL — SIGNIFICANT CHANGE UP (ref 10–20)
CALCIUM SERPL-MCNC: 7.8 MG/DL — LOW (ref 8.5–10.1)
CHLORIDE SERPL-SCNC: 104 MMOL/L — SIGNIFICANT CHANGE UP (ref 98–110)
CO2 SERPL-SCNC: 23 MMOL/L — SIGNIFICANT CHANGE UP (ref 17–32)
COVID-19 SPIKE DOMAIN AB INTERP: POSITIVE
COVID-19 SPIKE DOMAIN ANTIBODY RESULT: 387 AU/ML — HIGH
CREAT SERPL-MCNC: 0.7 MG/DL — SIGNIFICANT CHANGE UP (ref 0.7–1.5)
EOSINOPHIL # BLD AUTO: 0.04 K/UL — SIGNIFICANT CHANGE UP (ref 0–0.7)
EOSINOPHIL NFR BLD AUTO: 0.5 % — SIGNIFICANT CHANGE UP (ref 0–8)
GLUCOSE SERPL-MCNC: 83 MG/DL — SIGNIFICANT CHANGE UP (ref 70–99)
HCT VFR BLD CALC: 33.4 % — LOW (ref 37–47)
HGB BLD-MCNC: 11 G/DL — LOW (ref 12–16)
IMM GRANULOCYTES NFR BLD AUTO: 0.4 % — HIGH (ref 0.1–0.3)
LYMPHOCYTES # BLD AUTO: 1.28 K/UL — SIGNIFICANT CHANGE UP (ref 1.2–3.4)
LYMPHOCYTES # BLD AUTO: 17.1 % — LOW (ref 20.5–51.1)
MAGNESIUM SERPL-MCNC: 2 MG/DL — SIGNIFICANT CHANGE UP (ref 1.8–2.4)
MCHC RBC-ENTMCNC: 31.3 PG — HIGH (ref 27–31)
MCHC RBC-ENTMCNC: 32.9 G/DL — SIGNIFICANT CHANGE UP (ref 32–37)
MCV RBC AUTO: 95.2 FL — SIGNIFICANT CHANGE UP (ref 81–99)
MONOCYTES # BLD AUTO: 0.68 K/UL — HIGH (ref 0.1–0.6)
MONOCYTES NFR BLD AUTO: 9.1 % — SIGNIFICANT CHANGE UP (ref 1.7–9.3)
NEUTROPHILS # BLD AUTO: 5.41 K/UL — SIGNIFICANT CHANGE UP (ref 1.4–6.5)
NEUTROPHILS NFR BLD AUTO: 72.5 % — SIGNIFICANT CHANGE UP (ref 42.2–75.2)
NRBC # BLD: 0 /100 WBCS — SIGNIFICANT CHANGE UP (ref 0–0)
PHOSPHATE SERPL-MCNC: 1.9 MG/DL — LOW (ref 2.1–4.9)
PLATELET # BLD AUTO: 228 K/UL — SIGNIFICANT CHANGE UP (ref 130–400)
POTASSIUM SERPL-MCNC: 3.2 MMOL/L — LOW (ref 3.5–5)
POTASSIUM SERPL-SCNC: 3.2 MMOL/L — LOW (ref 3.5–5)
RBC # BLD: 3.51 M/UL — LOW (ref 4.2–5.4)
RBC # FLD: 12.3 % — SIGNIFICANT CHANGE UP (ref 11.5–14.5)
SARS-COV-2 IGG+IGM SERPL QL IA: 387 AU/ML — HIGH
SARS-COV-2 IGG+IGM SERPL QL IA: POSITIVE
SODIUM SERPL-SCNC: 141 MMOL/L — SIGNIFICANT CHANGE UP (ref 135–146)
WBC # BLD: 7.47 K/UL — SIGNIFICANT CHANGE UP (ref 4.8–10.8)
WBC # FLD AUTO: 7.47 K/UL — SIGNIFICANT CHANGE UP (ref 4.8–10.8)

## 2021-04-02 PROCEDURE — 99024 POSTOP FOLLOW-UP VISIT: CPT

## 2021-04-02 RX ORDER — ONDANSETRON 8 MG/1
4 TABLET, FILM COATED ORAL EVERY 6 HOURS
Refills: 0 | Status: COMPLETED | OUTPATIENT
Start: 2021-04-02 | End: 2021-04-03

## 2021-04-02 RX ADMIN — Medication 15 MILLIGRAM(S): at 23:34

## 2021-04-02 RX ADMIN — ONDANSETRON 4 MILLIGRAM(S): 8 TABLET, FILM COATED ORAL at 23:02

## 2021-04-02 RX ADMIN — Medication 15 MILLIGRAM(S): at 23:01

## 2021-04-02 RX ADMIN — Medication 100 GRAM(S): at 17:06

## 2021-04-02 RX ADMIN — SODIUM CHLORIDE 100 MILLILITER(S): 9 INJECTION INTRAMUSCULAR; INTRAVENOUS; SUBCUTANEOUS at 05:20

## 2021-04-02 RX ADMIN — HEPARIN SODIUM 5000 UNIT(S): 5000 INJECTION INTRAVENOUS; SUBCUTANEOUS at 13:32

## 2021-04-02 RX ADMIN — HEPARIN SODIUM 5000 UNIT(S): 5000 INJECTION INTRAVENOUS; SUBCUTANEOUS at 21:17

## 2021-04-02 RX ADMIN — Medication 100 GRAM(S): at 05:19

## 2021-04-02 RX ADMIN — PANTOPRAZOLE SODIUM 40 MILLIGRAM(S): 20 TABLET, DELAYED RELEASE ORAL at 13:32

## 2021-04-02 RX ADMIN — Medication 15 MILLIGRAM(S): at 13:31

## 2021-04-02 RX ADMIN — Medication 44 MICROGRAM(S): at 21:17

## 2021-04-02 RX ADMIN — Medication 62.5 MILLIMOLE(S): at 02:04

## 2021-04-02 RX ADMIN — HEPARIN SODIUM 5000 UNIT(S): 5000 INJECTION INTRAVENOUS; SUBCUTANEOUS at 05:19

## 2021-04-02 NOTE — PROGRESS NOTE ADULT - ATTENDING COMMENTS
LEXI AWAD is a 75yFemale POD 1 s/p Diagnostic laparoscopy with exploratory laparotomy for high grade sbo, Lysis of intestinal adhesions  Feels better. started having gas. No bm. Normal wbc , lactate. Abd soft, appropriately ttp incisions.     - NGT removed  - Start clears  - dc del cid void trial   - pm labs ,

## 2021-04-03 LAB
-  AMIKACIN: SIGNIFICANT CHANGE UP
-  AMOXICILLIN/CLAVULANIC ACID: SIGNIFICANT CHANGE UP
-  AMPICILLIN/SULBACTAM: SIGNIFICANT CHANGE UP
-  AMPICILLIN: SIGNIFICANT CHANGE UP
-  AZTREONAM: SIGNIFICANT CHANGE UP
-  CEFAZOLIN: SIGNIFICANT CHANGE UP
-  CEFEPIME: SIGNIFICANT CHANGE UP
-  CEFOXITIN: SIGNIFICANT CHANGE UP
-  CEFTRIAXONE: SIGNIFICANT CHANGE UP
-  CIPROFLOXACIN: SIGNIFICANT CHANGE UP
-  ERTAPENEM: SIGNIFICANT CHANGE UP
-  GENTAMICIN: SIGNIFICANT CHANGE UP
-  IMIPENEM: SIGNIFICANT CHANGE UP
-  LEVOFLOXACIN: SIGNIFICANT CHANGE UP
-  MEROPENEM: SIGNIFICANT CHANGE UP
-  NITROFURANTOIN: SIGNIFICANT CHANGE UP
-  PIPERACILLIN/TAZOBACTAM: SIGNIFICANT CHANGE UP
-  TIGECYCLINE: SIGNIFICANT CHANGE UP
-  TOBRAMYCIN: SIGNIFICANT CHANGE UP
-  TRIMETHOPRIM/SULFAMETHOXAZOLE: SIGNIFICANT CHANGE UP
ANION GAP SERPL CALC-SCNC: 11 MMOL/L — SIGNIFICANT CHANGE UP (ref 7–14)
ANION GAP SERPL CALC-SCNC: 15 MMOL/L — HIGH (ref 7–14)
BUN SERPL-MCNC: 13 MG/DL — SIGNIFICANT CHANGE UP (ref 10–20)
BUN SERPL-MCNC: 15 MG/DL — SIGNIFICANT CHANGE UP (ref 10–20)
CALCIUM SERPL-MCNC: 7.5 MG/DL — LOW (ref 8.5–10.1)
CALCIUM SERPL-MCNC: 7.6 MG/DL — LOW (ref 8.5–10.1)
CHLORIDE SERPL-SCNC: 103 MMOL/L — SIGNIFICANT CHANGE UP (ref 98–110)
CHLORIDE SERPL-SCNC: 104 MMOL/L — SIGNIFICANT CHANGE UP (ref 98–110)
CO2 SERPL-SCNC: 21 MMOL/L — SIGNIFICANT CHANGE UP (ref 17–32)
CO2 SERPL-SCNC: 23 MMOL/L — SIGNIFICANT CHANGE UP (ref 17–32)
CREAT SERPL-MCNC: 0.7 MG/DL — SIGNIFICANT CHANGE UP (ref 0.7–1.5)
CREAT SERPL-MCNC: 0.8 MG/DL — SIGNIFICANT CHANGE UP (ref 0.7–1.5)
CULTURE RESULTS: SIGNIFICANT CHANGE UP
GLUCOSE SERPL-MCNC: 75 MG/DL — SIGNIFICANT CHANGE UP (ref 70–99)
GLUCOSE SERPL-MCNC: 84 MG/DL — SIGNIFICANT CHANGE UP (ref 70–99)
HCT VFR BLD CALC: 29.4 % — LOW (ref 37–47)
HCT VFR BLD CALC: 30.2 % — LOW (ref 37–47)
HGB BLD-MCNC: 10.1 G/DL — LOW (ref 12–16)
HGB BLD-MCNC: 10.2 G/DL — LOW (ref 12–16)
MAGNESIUM SERPL-MCNC: 2 MG/DL — SIGNIFICANT CHANGE UP (ref 1.8–2.4)
MCHC RBC-ENTMCNC: 31.3 PG — HIGH (ref 27–31)
MCHC RBC-ENTMCNC: 32.2 PG — HIGH (ref 27–31)
MCHC RBC-ENTMCNC: 33.4 G/DL — SIGNIFICANT CHANGE UP (ref 32–37)
MCHC RBC-ENTMCNC: 34.7 G/DL — SIGNIFICANT CHANGE UP (ref 32–37)
MCV RBC AUTO: 92.7 FL — SIGNIFICANT CHANGE UP (ref 81–99)
MCV RBC AUTO: 93.5 FL — SIGNIFICANT CHANGE UP (ref 81–99)
METHOD TYPE: SIGNIFICANT CHANGE UP
NRBC # BLD: 0 /100 WBCS — SIGNIFICANT CHANGE UP (ref 0–0)
NRBC # BLD: 0 /100 WBCS — SIGNIFICANT CHANGE UP (ref 0–0)
ORGANISM # SPEC MICROSCOPIC CNT: SIGNIFICANT CHANGE UP
ORGANISM # SPEC MICROSCOPIC CNT: SIGNIFICANT CHANGE UP
PHOSPHATE SERPL-MCNC: 2.4 MG/DL — SIGNIFICANT CHANGE UP (ref 2.1–4.9)
PHOSPHATE SERPL-MCNC: 3.1 MG/DL — SIGNIFICANT CHANGE UP (ref 2.1–4.9)
PLATELET # BLD AUTO: 199 K/UL — SIGNIFICANT CHANGE UP (ref 130–400)
PLATELET # BLD AUTO: 221 K/UL — SIGNIFICANT CHANGE UP (ref 130–400)
POTASSIUM SERPL-MCNC: 3.6 MMOL/L — SIGNIFICANT CHANGE UP (ref 3.5–5)
POTASSIUM SERPL-MCNC: 4.2 MMOL/L — SIGNIFICANT CHANGE UP (ref 3.5–5)
POTASSIUM SERPL-SCNC: 3.6 MMOL/L — SIGNIFICANT CHANGE UP (ref 3.5–5)
POTASSIUM SERPL-SCNC: 4.2 MMOL/L — SIGNIFICANT CHANGE UP (ref 3.5–5)
RBC # BLD: 3.17 M/UL — LOW (ref 4.2–5.4)
RBC # BLD: 3.23 M/UL — LOW (ref 4.2–5.4)
RBC # FLD: 12.1 % — SIGNIFICANT CHANGE UP (ref 11.5–14.5)
RBC # FLD: 12.1 % — SIGNIFICANT CHANGE UP (ref 11.5–14.5)
SODIUM SERPL-SCNC: 138 MMOL/L — SIGNIFICANT CHANGE UP (ref 135–146)
SODIUM SERPL-SCNC: 139 MMOL/L — SIGNIFICANT CHANGE UP (ref 135–146)
SPECIMEN SOURCE: SIGNIFICANT CHANGE UP
WBC # BLD: 6.17 K/UL — SIGNIFICANT CHANGE UP (ref 4.8–10.8)
WBC # BLD: 6.28 K/UL — SIGNIFICANT CHANGE UP (ref 4.8–10.8)
WBC # FLD AUTO: 6.17 K/UL — SIGNIFICANT CHANGE UP (ref 4.8–10.8)
WBC # FLD AUTO: 6.28 K/UL — SIGNIFICANT CHANGE UP (ref 4.8–10.8)

## 2021-04-03 PROCEDURE — 99024 POSTOP FOLLOW-UP VISIT: CPT

## 2021-04-03 RX ORDER — POTASSIUM CHLORIDE 20 MEQ
20 PACKET (EA) ORAL ONCE
Refills: 0 | Status: COMPLETED | OUTPATIENT
Start: 2021-04-03 | End: 2021-04-03

## 2021-04-03 RX ORDER — POTASSIUM PHOSPHATE, MONOBASIC POTASSIUM PHOSPHATE, DIBASIC 236; 224 MG/ML; MG/ML
30 INJECTION, SOLUTION INTRAVENOUS ONCE
Refills: 0 | Status: COMPLETED | OUTPATIENT
Start: 2021-04-03 | End: 2021-04-03

## 2021-04-03 RX ORDER — LEVOTHYROXINE SODIUM 125 MCG
88 TABLET ORAL DAILY
Refills: 0 | Status: DISCONTINUED | OUTPATIENT
Start: 2021-04-03 | End: 2021-04-04

## 2021-04-03 RX ADMIN — POTASSIUM PHOSPHATE, MONOBASIC POTASSIUM PHOSPHATE, DIBASIC 83.33 MILLIMOLE(S): 236; 224 INJECTION, SOLUTION INTRAVENOUS at 05:22

## 2021-04-03 RX ADMIN — PANTOPRAZOLE SODIUM 40 MILLIGRAM(S): 20 TABLET, DELAYED RELEASE ORAL at 12:01

## 2021-04-03 RX ADMIN — ONDANSETRON 4 MILLIGRAM(S): 8 TABLET, FILM COATED ORAL at 05:22

## 2021-04-03 RX ADMIN — HEPARIN SODIUM 5000 UNIT(S): 5000 INJECTION INTRAVENOUS; SUBCUTANEOUS at 21:32

## 2021-04-03 RX ADMIN — Medication 50 MILLIEQUIVALENT(S): at 12:03

## 2021-04-03 RX ADMIN — Medication 100 GRAM(S): at 17:23

## 2021-04-03 RX ADMIN — Medication 100 GRAM(S): at 05:22

## 2021-04-03 RX ADMIN — Medication 15 MILLIGRAM(S): at 21:32

## 2021-04-03 RX ADMIN — HEPARIN SODIUM 5000 UNIT(S): 5000 INJECTION INTRAVENOUS; SUBCUTANEOUS at 14:07

## 2021-04-03 RX ADMIN — ONDANSETRON 4 MILLIGRAM(S): 8 TABLET, FILM COATED ORAL at 12:00

## 2021-04-03 RX ADMIN — Medication 15 MILLIGRAM(S): at 12:02

## 2021-04-03 RX ADMIN — HEPARIN SODIUM 5000 UNIT(S): 5000 INJECTION INTRAVENOUS; SUBCUTANEOUS at 05:22

## 2021-04-03 NOTE — PROGRESS NOTE ADULT - ATTENDING COMMENTS
LEXI AWAD is a 75yFemale POD 1 s/p Diagnostic laparoscopy with exploratory laparotomy for high grade sbo, Lysis of intestinal adhesions  Feels better. tolerating clears. OOB with pt ambulating. Continues to pass gas. No bm. Normal wbc , lactate. Abd soft, appropriately ttp incisions, incision c/d/i     Adv to reg diet as tolerated   pm labs   possible dc later today vs tomorrow if tolerating diet  pt eval   encourage IS   OOB to chair / ambulating

## 2021-04-04 ENCOUNTER — TRANSCRIPTION ENCOUNTER (OUTPATIENT)
Age: 76
End: 2021-04-04

## 2021-04-04 VITALS
RESPIRATION RATE: 20 BRPM | TEMPERATURE: 99 F | SYSTOLIC BLOOD PRESSURE: 128 MMHG | HEART RATE: 87 BPM | DIASTOLIC BLOOD PRESSURE: 72 MMHG

## 2021-04-04 PROCEDURE — 99024 POSTOP FOLLOW-UP VISIT: CPT

## 2021-04-04 RX ORDER — POTASSIUM PHOSPHATE, MONOBASIC POTASSIUM PHOSPHATE, DIBASIC 236; 224 MG/ML; MG/ML
30 INJECTION, SOLUTION INTRAVENOUS ONCE
Refills: 0 | Status: COMPLETED | OUTPATIENT
Start: 2021-04-04 | End: 2021-04-04

## 2021-04-04 RX ORDER — POLYETHYLENE GLYCOL 3350 17 G/17G
17 POWDER, FOR SOLUTION ORAL
Qty: 170 | Refills: 0
Start: 2021-04-04 | End: 2021-04-13

## 2021-04-04 RX ADMIN — Medication 15 MILLIGRAM(S): at 11:27

## 2021-04-04 RX ADMIN — HEPARIN SODIUM 5000 UNIT(S): 5000 INJECTION INTRAVENOUS; SUBCUTANEOUS at 05:41

## 2021-04-04 RX ADMIN — Medication 88 MICROGRAM(S): at 05:40

## 2021-04-04 RX ADMIN — Medication 100 GRAM(S): at 05:39

## 2021-04-04 RX ADMIN — POTASSIUM PHOSPHATE, MONOBASIC POTASSIUM PHOSPHATE, DIBASIC 83.33 MILLIMOLE(S): 236; 224 INJECTION, SOLUTION INTRAVENOUS at 05:39

## 2021-04-04 RX ADMIN — PANTOPRAZOLE SODIUM 40 MILLIGRAM(S): 20 TABLET, DELAYED RELEASE ORAL at 11:29

## 2021-04-04 NOTE — DISCHARGE NOTE NURSING/CASE MANAGEMENT/SOCIAL WORK - PATIENT PORTAL LINK FT
You can access the FollowMyHealth Patient Portal offered by [facility] by registering at the following website: [email or web address]. By joining [facility]’s FollowMyHealth portal, you will also be able to view your health information using other applications (apps) compatible with our system.

## 2021-04-04 NOTE — PROGRESS NOTE ADULT - ASSESSMENT
A/P:  LEXI AWAD is a 75yFemalePOD#0 for Diagnostic laparoscopy with exploratory laparotomy  Lysis of intestinal adhesions    patient doing well post op. minimal pain. del cid in place, NGT in place,    Plan:   NPO  IVF  OOB  vitals  labs  PT
Assessment:  75y Female patient admitted S/P diagnostic laparoscopy converted to open with lysis of adhesions  Patient seen and examined at bedside. NAD.   Midline abdominal dressing saturated with bright red blood. Took down dressing, and appears to be a skin bleed from in between staples on superior aspect of wound. Cleansed wound, packed a piece of Surgicel and redressed. Will follow.     Plan:  - Monitor vitals  - Monitor labs and replete as necessary  - Watch Hb.  - Change dressing PRN  - Monitor for bowel function  - Continue Pain Medications if necessary  - Continue Antibiotics if necessary  - Encourage ambulation as tolerated  - Monitor urine output and trial of void  - DVT and GI Prophylaxis  - Follow PT/Physiatry if necessary    Date/Time: 04-03-21 @ 06:15
A/P:  LEXI AWAD is a 75yFemale for Diagnostic laparoscopy with exploratory laparotomy  Lysis of intestinal adhesions  started having gas. NGT removed    Plan:   f/u bowel function  vitals  labs  ambulate  IS  
75 y Female POD# 3 for Diagnostic laparoscopy with exploratory laparotomy, Lysis of intestinal adhesions. Currently stable, pain controlled, tolerating diet, passing ibis no BM, surgical wound dry and clean no signs of active bleeding      Plan:   Continue diet as tolerated  f/u bowel function  vitals  labs  ambulate  IS  DVT and GI prophylaxis

## 2021-04-04 NOTE — DISCHARGE NOTE PROVIDER - CARE PROVIDER_API CALL
Christopher Page (DO)  [facility]  [address] [place] [address]  Phone: [phone]  Fax: [phone]  Follow Up Time:

## 2021-04-04 NOTE — PROGRESS NOTE ADULT - ATTENDING COMMENTS
Tolerating reg diet . Passing gas , no bm. Abd soft, ttp incision, nd , incision - staples c/d/i, OOB ambulating. DC home today. follow up in office. bowel regimen for home.

## 2021-04-04 NOTE — DISCHARGE NOTE PROVIDER - NSDCFUADDINST_GEN_ALL_CORE_FT
s/p diagnostic laparoscopy converted to exploratory laparotomy with lysis of adhesions    diet: resume current diet  activity: no heavy lifting (over 10 lbs) for 6 weeks.  medication: Please take over the counter tylenol every 6 hours and motrin every 8 hours as needed for pain. Ok to take over the counter miralax for stool softener.  dressing: Ok to remove dressing while showering. do not scrub area.     follow up: Please follow up with Dr. Page in the office in 1-2 weeks. Please call to set up an appointment.

## 2021-04-04 NOTE — DISCHARGE NOTE PROVIDER - NSDCMRMEDTOKEN_GEN_ALL_CORE_FT
MiraLax oral powder for reconstitution: 17 gram(s) orally once a day, As Needed MDD:once a day  SYNTHROID 88 MCG TABLET: take 1 tablet by mouth once daily

## 2021-04-04 NOTE — DISCHARGE NOTE PROVIDER - HOSPITAL COURSE
HPI:  75 year old female w/ PMHx of hypothroidism, oophrectomy, open appendectomy, open cholecystectomy, ex-lap PORSCHE-BSO, presented to the ED with abdominal pain. She stated that her pain started 2 days prior to presentation, associated with nausea, and vomiting x1. Her pain was intermittent, progressing to constant generalized abdominal pain. She denied any relieving factors. She had never been diagnosed with am SBO in the past, not had she experienced similar pain prior. Her last bowel movement & evelin was 2 days prior. Similarly, she was not tolerating. She called her GI, (Dr. Perez) who advised her to go to the ED. Her last c-scope was approximately 5 years ago, negative per patient. She denied fever, chills, chest pain, palpitations, SOB, wheezing, diarrhea, dysuria.  In ED, wbc 10.7 with a left shift. Lactate 1.7. CT findings consistent with high grade SBO with a transition point in the pelvis. NGT was placed with 500cc bilious output and improvement of symptoms.   Patient went to the OR for diagnostic laparoscopy which was converted to open. Point of obstruction was identified and the adhesive bands were lysed.  Patient tolerated procedure well.   NGT was removed POD1. del cid removed and passed TOV  Diet advanced, currently on regular diet and tolerating  Passing gas, no bowel movements yet.    Patient is medically stable prior to discharge    Vital Signs  T(C): 36.8 (04 Apr 2021 05:06), Max: 36.8 (04 Apr 2021 00:26)  T(F): 98.3 (04 Apr 2021 05:06), Max: 98.3 (04 Apr 2021 05:06)  HR: 73 (04 Apr 2021 05:06) (73 - 80)  BP: 118/62 (04 Apr 2021 05:06) (114/61 - 137/70)  RR: 18 (04 Apr 2021 05:06) (18 - 20)  SpO2: --

## 2021-04-04 NOTE — PROGRESS NOTE ADULT - SUBJECTIVE AND OBJECTIVE BOX
Progress Note: General Surgery  Patient: LEXI AWAD , 75y (1945)Female   MRN: [id number]  Location: [address]  Visit: 03-31-21 Inpatient  Date: 04-03-21 @ 06:15    Admit Diagnosis/Chief Complaint: Small bowel obstruction    Procedure/Diagnosis: Small bowel obstruction     S/P Diagnostic laparoscopy with exploratory laparotomy    Lysis of intestinal adhesions     POD# 2    Events/ 24h: Midline abdominal dressing saturated with bright red blood. Took down dressing, and appears to be a skin bleed from in between staples on superior aspect of wound. Cleansed wound, packed a piece of Surgicel and redressed. Will follow.     Vitals: T(F): 98.9 (04-03-21 @ 04:49), Max: 98.9 (04-03-21 @ 04:49)  HR: 77 (04-03-21 @ 04:49)  BP: 105/54 (04-03-21 @ 04:49) (94/54 - 113/57)  RR: 18 (04-03-21 @ 04:49)  SpO2: --    In:   04-01-21 @ 07:01  -  04-02-21 @ 07:00  --------------------------------------------------------  IN: 0 mL    04-02-21 @ 07:01  -  04-03-21 @ 06:15  --------------------------------------------------------  IN: 286 mL      Out:   04-01-21 @ 07:01  -  04-02-21 @ 07:00  --------------------------------------------------------  OUT:    Indwelling Catheter - Urethral (mL): 1350 mL    Nasogastric/Oral tube (mL): 355 mL  Total OUT: 1705 mL      04-02-21 @ 07:01  -  04-03-21 @ 06:15  --------------------------------------------------------  OUT:    Emesis (mL): 10 mL    Indwelling Catheter - Urethral (mL): 420 mL    Voided (mL): 180 mL  Total OUT: 610 mL        Net:   04-01-21 @ 07:01  -  04-02-21 @ 07:00  --------------------------------------------------------  NET: -1705 mL    04-02-21 @ 07:01  -  04-03-21 @ 06:15  --------------------------------------------------------  NET: -324 mL        Diet: Diet, Clear Liquid (04-02-21 @ 09:28)    IV Fluids: potassium chloride  20 mEq/100 mL IVPB 20 milliEquivalent(s) IV Intermittent once      Physical Examination:  General Appearance: NAD   HEENT: EOMI, sclera non-icteric.  Heart: RRR   Lungs: CTABL.   Abdomen:  Soft, nontender, nondistended. Midline abdominal dressing saturated, now changed.  MSK/Extremities: Warm & well-perfused.   Skin: Warm, dry. No jaundice.       Medications: [Standing]  cefoTEtan  IVPB 1 Gram(s) IV Intermittent every 12 hours  heparin   Injectable 5000 Unit(s) SubCutaneous every 8 hours  levothyroxine Injectable 44 MICROGram(s) IV Push at bedtime  ondansetron    Tablet 4 milliGRAM(s) Oral every 6 hours  pantoprazole  Injectable 40 milliGRAM(s) IV Push daily  potassium chloride  20 mEq/100 mL IVPB 20 milliEquivalent(s) IV Intermittent once    DVT Prophylaxis: heparin   Injectable 5000 Unit(s) SubCutaneous every 8 hours    GI Prophylaxis: pantoprazole  Injectable 40 milliGRAM(s) IV Push daily    Antibiotics: cefoTEtan  IVPB 1 Gram(s) IV Intermittent every 12 hours    Anticoagulation:   Medications:[PRN]  benzocaine 20% Spray 1 Spray(s) Topical every 6 hours PRN  ketorolac   Injectable 15 milliGRAM(s) IV Push every 8 hours PRN      Labs:                        11.0   7.47  )-----------( 228      ( 02 Apr 2021 22:09 )             33.4     04-02    141  |  104  |  18  ----------------------------<  83  3.2<L>   |  23  |  0.7    Ca    7.8<L>      02 Apr 2021 22:09  Phos  1.9     04-02  Mg     2.0     04-02      Urine/Micro:    Culture - Urine (collected 31 Mar 2021 13:15)  Source: .Urine Clean Catch (Midstream)  Preliminary Report (02 Apr 2021 03:27):    >100,000 CFU/ml Escherichia coli          
GENERAL SURGERY PROGRESS NOTE     LEXI AWAD  75y  Female  Hospital day :4d    OVERNIGHT EVENTS: Patient stable, no acute events, pain controlled, tolerating diet, passing ibis no BM, surgical wound dry and clean no signs of active bleeding    ICU Vital Signs Last 24 Hrs  T(C): 36.8 (04 Apr 2021 00:26), Max: 37.2 (03 Apr 2021 04:49)  T(F): 98.2 (04 Apr 2021 00:26), Max: 98.9 (03 Apr 2021 04:49)  HR: 74 (04 Apr 2021 00:26) (73 - 80)  BP: 114/61 (04 Apr 2021 00:26) (105/54 - 137/70)  RR: 18 (04 Apr 2021 00:26) (18 - 20)    GENERAL: NAD, A&O  CHEST/LUNG: Clear to auscultation bilaterally  HEART: Regular rate and rhythm  ABDOMEN: Soft, Nontender, Nondistended; surgical wound dry and clean no signs of active bleeding  EXTREMITIES:  No clubbing, cyanosis, or edema      DIET/FLUIDS: potassium phosphate IVPB 30 milliMole(s) IV Intermittent once    NG:                                                                                DRAINS:     BM:     EMESIS:   04-02-21 @ 07:01  -  04-03-21 @ 07:00  --------------------------------------------------------  OUT: 0.16 mL/kg/d      URINE:   04-02-21 @ 07:01  -  04-03-21 @ 07:00  --------------------------------------------------------  OUT: 420 mL       GI proph:  pantoprazole  Injectable 40 milliGRAM(s) IV Push daily    AC/ proph: heparin   Injectable 5000 Unit(s) SubCutaneous every 8 hours    ABx: cefoTEtan  IVPB 1 Gram(s) IV Intermittent every 12 hours          LABS  Labs:  CAPILLARY BLOOD GLUCOSE                              10.2   6.17  )-----------( 199      ( 03 Apr 2021 22:15 )             29.4         04-03    138  |  104  |  13  ----------------------------<  84  4.2   |  23  |  0.8      Calcium, Total Serum: 7.6 mg/dL (04-03-21 @ 22:15)      LFTs:     Lactate, Blood: 1.0 mmol/L (04-01-21 @ 05:36)            
GENERAL SURGERY PROGRESS NOTE     LEXI AWAD  [address] [place] [address] day :1d  POD:  Procedure: Diagnostic laparoscopy with exploratory laparotomy  Lysis of intestinal adhesions      Surgical Attending: Christopher Page  Overnight events: tolerated surgery well. minimal tenderness.     T(F): 98.3 (21 @ 08:13), Max: 99.2 (21 @ 03:00)  HR: 77 (21 08:13) (72 - 91)  BP: 128/69 (21 @ 08:13) (106/63 - 149/74)  ABP: --  ABP(mean): --  RR: 18 (21 @ 08:13) (16 - 27)  SpO2: 100% (21 @ 08:13) (99% - 100%)      21 @ 07:  -  21 @ 07:00  --------------------------------------------------------  IN:    IV PiggyBack: 100 mL    Lactated Ringers: 75 mL    sodium chloride 0.9%: 100 mL  Total IN: 275 mL    OUT:    Indwelling Catheter - Urethral (mL): 200 mL    Nasogastric/Oral tube (mL): 150 mL  Total OUT: 350 mL    Total NET: -75 mL      DIET/FLUIDS: lactated ringers Bolus 500 milliLiter(s) IV Bolus once  sodium chloride 0.9%. 1000 milliLiter(s) IV Continuous <Continuous>    N21 @ 07:  -  21 @ 07:00  --------------------------------------------------------  OUT: 150 mL    URINE:   21 @ 07:01  -  21 @ 07:00  --------------------------------------------------------  OUT: 200 mL     Indwelling Urethral Catheter:     Connect To:  Straight Drainage/Gravity    Indication:  Improved Comfort Care (21 @ 01:03)    GI proph:  pantoprazole  Injectable 40 milliGRAM(s) IV Push daily    AC/ proph: heparin   Injectable 5000 Unit(s) SubCutaneous every 8 hours    ABx: cefoTEtan  IVPB 1 Gram(s) IV Intermittent every 12 hours      GEN: NAD, well appearing  HEENT: NC/AT  CHEST: Symmetric chest wall expansion. Regular heart rate  ABD: S/D, mild dyllan-incisional tenderness    LABS  Labs:  CAPILLARY BLOOD GLUCOSE                              12.6   10.02 )-----------( 232      ( 2021 05:36 )             37.7       Auto Neutrophil %: 85.8 % (21 @ 05:36)  Auto Immature Granulocyte %: 0.4 % (21 @ 05:36)  Auto Neutrophil %: 83.6 % (21 @ 12:20)  Auto Immature Granulocyte %: 0.3 % (21 @ 12:20)        136  |  102  |  16  ----------------------------<  141<H>  4.1   |  25  |  0.9      Magnesium, Serum: 1.9 mg/dL (21 @ 05:36)      LFTs:             7.5  | 1.0  | 32       ------------------[57      ( 31 Mar 2021 12:20 )  4.7  | x    | 18          Lipase:19     Amylase:x         Lactate, Blood: 1.0 mmol/L (21 @ 05:36)  Lactate, Blood: 1.9 mmol/L (21 @ 20:50)  Lactate, Blood: 1.7 mmol/L (21 @ 12:20)      Coags:     12.00  ----< 1.04    ( 31 Mar 2021 20:50 )     29.8      Urinalysis Basic - ( 31 Mar 2021 13:15 )    Color: Light Yellow / Appearance: Clear / S.009 / pH: x  Gluc: x / Ketone: Trace  / Bili: Negative / Urobili: <2 mg/dL   Blood: x / Protein: Negative / Nitrite: Negative   Leuk Esterase: Negative / RBC: x / WBC x   Sq Epi: x / Non Sq Epi: x / Bacteria: x      RADIOLOGY & ADDITIONAL TESTS:    < from: CT Abdomen and Pelvis w/ Oral Cont and w/ IV Cont (21 @ 15:00) >  IMPRESSION:    1.  Small bowel obstruction with transition point in the pelvis. Slight associated mesenteric swirling raising possibility of high-grade obstruction.  2.  Small ascites and mesenteric edema.  3.  Indeterminate 2.1 cm right upper pole renal lesion. Recommend further evaluation with outpatient MRI to differentiate between complex cyst and solid lesion.  4.  Left lower lobe bronchiectasis with numerous associated nodules/areas of nodularity. While findings may be postinfectious/postinflammatory, dedicated chest CT follow up in 3 months is recommended.    < end of copied text >          
GENERAL SURGERY PROGRESS NOTE     LEXI AWAD  [address] [place] [address] day :2d  POD:  Procedure: Diagnostic laparoscopy with exploratory laparotomy    Lysis of intestinal adhesions      Surgical Attending: Christopher Page  Overnight events: No acute events overnight. Patient found in NAD. passing gas, no BM    T(F): 97.6 (21 @ 04:30), Max: 100 (21 @ 17:48)  HR: 74 (21 @ 04:30) (74 - 96)  BP: 110/57 (21 @ 04:30) (110/57 - 148/71)  ABP: --  ABP(mean): --  RR: 18 (21 @ 04:30) (18 - 18)  SpO2: 97% (21 @ 17:48) (96% - 100%)      21 @ 07:01  -  21 @ 07:00  --------------------------------------------------------  IN:  Total IN: 0 mL    OUT:    Indwelling Catheter - Urethral (mL): 1350 mL    Nasogastric/Oral tube (mL): 355 mL  Total OUT: 1705 mL    Total NET: -1705 mL        DIET/FLUIDS: sodium chloride 0.9%. 1000 milliLiter(s) IV Continuous <Continuous>    N21 @ 07:01  -  21 @ 07:00  --------------------------------------------------------  OUT: 355 mL                                                                                 DRAINS:     BM:     EMESIS:     URINE:   21 @ 07:  -  21 @ 07:00  --------------------------------------------------------  OUT: 1350 mL       GI proph:  pantoprazole  Injectable 40 milliGRAM(s) IV Push daily    AC/ proph: heparin   Injectable 5000 Unit(s) SubCutaneous every 8 hours    ABx: cefoTEtan  IVPB 1 Gram(s) IV Intermittent every 12 hours      GEN: NAD, well appearing  HEENT: NC/AT  CHEST: Symmetric chest wall expansion. Regular heart rate  ABD: S/D dyllan-incisional tenderness     LABS  Labs:  CAPILLARY BLOOD GLUCOSE                              13.2   8.46  )-----------( 212      ( 2021 22:01 )             38.9             138  |  106  |  14  ----------------------------<  90  4.0   |  22  |  0.9      Calcium, Total Serum: 8.2 mg/dL (21 @ 22:01)      LFTs:             7.5  | 1.0  | 32       ------------------[57      ( 31 Mar 2021 12:20 )  4.7  | x    | 18          Lipase:19     Amylase:x         Lactate, Blood: 1.0 mmol/L (21 @ 05:36)  Lactate, Blood: 1.9 mmol/L (21 @ 20:50)  Lactate, Blood: 1.7 mmol/L (21 @ 12:20)      Coags:     12.00  ----< 1.04    ( 31 Mar 2021 20:50 )     29.8                Urinalysis Basic - ( 31 Mar 2021 13:15 )    Color: Light Yellow / Appearance: Clear / S.009 / pH: x  Gluc: x / Ketone: Trace  / Bili: Negative / Urobili: <2 mg/dL   Blood: x / Protein: Negative / Nitrite: Negative   Leuk Esterase: Negative / RBC: x / WBC x   Sq Epi: x / Non Sq Epi: x / Bacteria: x        Culture - Urine (collected 31 Mar 2021 13:15)  Source: .Urine Clean Catch (Midstream)  Preliminary Report (2021 03:27):    >100,000 CFU/ml Escherichia coli          RADIOLOGY & ADDITIONAL TESTS:

## 2021-04-08 DIAGNOSIS — Z53.31 LAPAROSCOPIC SURGICAL PROCEDURE CONVERTED TO OPEN PROCEDURE: ICD-10-CM

## 2021-04-08 DIAGNOSIS — K56.50 INTESTINAL ADHESIONS [BANDS], UNSPECIFIED AS TO PARTIAL VERSUS COMPLETE OBSTRUCTION: ICD-10-CM

## 2021-04-08 DIAGNOSIS — Z90.710 ACQUIRED ABSENCE OF BOTH CERVIX AND UTERUS: ICD-10-CM

## 2021-04-08 DIAGNOSIS — Z90.79 ACQUIRED ABSENCE OF OTHER GENITAL ORGAN(S): ICD-10-CM

## 2021-04-08 DIAGNOSIS — E03.9 HYPOTHYROIDISM, UNSPECIFIED: ICD-10-CM

## 2021-04-08 DIAGNOSIS — Z90.722 ACQUIRED ABSENCE OF OVARIES, BILATERAL: ICD-10-CM

## 2021-04-09 PROBLEM — Z00.00 ENCOUNTER FOR PREVENTIVE HEALTH EXAMINATION: Status: ACTIVE | Noted: 2021-04-09

## 2021-04-09 PROBLEM — E03.9 HYPOTHYROIDISM, UNSPECIFIED: Chronic | Status: ACTIVE | Noted: 2021-03-31

## 2021-04-15 ENCOUNTER — APPOINTMENT (OUTPATIENT)
Dept: SURGERY | Facility: CLINIC | Age: 76
End: 2021-04-15
Payer: MEDICARE

## 2021-04-15 VITALS
WEIGHT: 109 LBS | BODY MASS INDEX: 18.16 KG/M2 | TEMPERATURE: 97.6 F | DIASTOLIC BLOOD PRESSURE: 84 MMHG | SYSTOLIC BLOOD PRESSURE: 132 MMHG | HEART RATE: 75 BPM | HEIGHT: 65 IN

## 2021-04-15 DIAGNOSIS — K91.30 POSTPROCEDURAL INTESTINAL OBSTRUCTION, UNSPECIFIED AS TO PARTIAL VERSUS COMPLETE: ICD-10-CM

## 2021-04-15 PROCEDURE — 99024 POSTOP FOLLOW-UP VISIT: CPT

## 2021-04-15 NOTE — ASSESSMENT
[FreeTextEntry1] : 75F pmhx open appendectomy, open cholecystectomy, open hysterectomy p/w sbo s/p ex lap JACINTO, relief of obstruction. No complaints. normal diet. Normal bms. No pain. Denies fever, chills, sob, n/v/d.\par \par NAD\par Abd - soft, nt, nd, incision cdi staples in place healing well \par \par \par PLAN: \par \par Staples removed \par slightly underweight BMI 18 - inc caloric intake, start ensures\par Follow up with PCP\par Follow up with general surgery PRN

## 2022-04-06 ENCOUNTER — OUTPATIENT (OUTPATIENT)
Dept: OUTPATIENT SERVICES | Facility: HOSPITAL | Age: 77
LOS: 1 days | Discharge: HOME | End: 2022-04-06
Payer: MEDICARE

## 2022-04-06 DIAGNOSIS — Z12.31 ENCOUNTER FOR SCREENING MAMMOGRAM FOR MALIGNANT NEOPLASM OF BREAST: ICD-10-CM

## 2022-04-06 DIAGNOSIS — Z90.710 ACQUIRED ABSENCE OF BOTH CERVIX AND UTERUS: Chronic | ICD-10-CM

## 2022-04-06 DIAGNOSIS — Z90.49 ACQUIRED ABSENCE OF OTHER SPECIFIED PARTS OF DIGESTIVE TRACT: Chronic | ICD-10-CM

## 2022-04-06 PROCEDURE — 77067 SCR MAMMO BI INCL CAD: CPT | Mod: 26

## 2022-04-06 PROCEDURE — 77063 BREAST TOMOSYNTHESIS BI: CPT | Mod: 26

## 2023-08-28 ENCOUNTER — APPOINTMENT (OUTPATIENT)
Dept: PULMONOLOGY | Facility: CLINIC | Age: 78
End: 2023-08-28
Payer: MEDICARE

## 2023-08-28 PROCEDURE — 99203 OFFICE O/P NEW LOW 30 MIN: CPT

## 2023-08-28 RX ORDER — AZELASTINE HYDROCHLORIDE 137 UG/1
0.1 SPRAY, METERED NASAL DAILY
Qty: 1 | Refills: 5 | Status: ACTIVE | COMMUNITY
Start: 2023-08-28 | End: 1900-01-01

## 2023-08-28 RX ORDER — FLUTICASONE PROPIONATE AND SALMETEROL 250; 50 UG/1; UG/1
250-50 POWDER RESPIRATORY (INHALATION)
Qty: 1 | Refills: 5 | Status: ACTIVE | COMMUNITY
Start: 2023-08-28 | End: 1900-01-01

## 2023-08-28 NOTE — ASSESSMENT
[FreeTextEntry1] : I believe there is several ongoing issues with making contributory.  First the patient has signs of significant bronchiectasis.  This may have started with a pneumonia in her teen years but then worsened with her likely aspiration at the time of her bowel obstruction.  I believe this is an ongoing source of inflammation.  MAC may be an ongoing problem.  Secondly the patient has a postnasal drip syndrome.  This is perennial and may be related to dust.  A: Allergic rhinitis:  I feel the patient has a post nasal drip syndrome due to allergen exposure . Rx to use saline nasal washes BID. .  add Azelastine at HS. f/u 6m  Assessment: COPD   Bronchiectasis:  plan: Stress compliance with inhalers. Renewed today. He had ICS/LABA needs PFT at some point weight loss CT chest should be repeated once she gets the previous CAT scan discs for comparison F/U 3 months

## 2023-08-28 NOTE — PHYSICAL EXAM
[No Acute Distress] : no acute distress [Normal Oropharynx] : normal oropharynx [Normal Appearance] : normal appearance [No Neck Mass] : no neck mass [Normal Rate/Rhythm] : normal rate/rhythm [Normal S1, S2] : normal s1, s2 [No Murmurs] : no murmurs [No Resp Distress] : no resp distress [No Abnormalities] : no abnormalities [Benign] : benign [Normal Gait] : normal gait [No Clubbing] : no clubbing [No Cyanosis] : no cyanosis [No Edema] : no edema [FROM] : FROM [Normal Color/ Pigmentation] : normal color/ pigmentation [No Focal Deficits] : no focal deficits [Oriented x3] : oriented x3 [Normal Affect] : normal affect [TextBox_68] : Rales left base

## 2023-08-28 NOTE — REASON FOR VISIT
[Initial] : an initial visit [TextBox_44] : The patient presents for evaluation of several problems.  First she is chronically weak and rundown.  She has low energy.  Second she has been suffering from vertigo.  She has seen ENT and several other doctors with no significant release.  The patient also brings with her CAT scans.  She was admitted to the hospital for an evaluation where she had a CAT scan of her chest and MRI of the abdomen.  This was to try to evaluate for recurrence of ovarian cancer.  The CAT scan of the chest demonstrated signs of left lower lobe bronchiectasis with mucus impaction.  They compared this with an old CAT scan from about a year previous which showed progression.  The patient relates being admitted to the hospital several years ago with a bowel obstruction.  She had nausea and vomiting at that time.  And it was presumed that she likely aspirated.  The patient also had a bad pneumonia when she was a teenager but does not recall many of the details.  Both of these items may be sources for the bronchiectasis.  The patient was a smoker for about 15 years total stopping 45 to 50 years ago.  She does not have any specific pulmonary complaints.  She does admit to chronically clearing her throat and having a constant runny nose.  She coughs up mucus every morning.  This is been progressive for years.

## 2023-09-25 ENCOUNTER — OUTPATIENT (OUTPATIENT)
Dept: OUTPATIENT SERVICES | Facility: HOSPITAL | Age: 78
LOS: 1 days | End: 2023-09-25
Payer: MEDICARE

## 2023-09-25 ENCOUNTER — RESULT REVIEW (OUTPATIENT)
Age: 78
End: 2023-09-25

## 2023-09-25 DIAGNOSIS — Z90.49 ACQUIRED ABSENCE OF OTHER SPECIFIED PARTS OF DIGESTIVE TRACT: Chronic | ICD-10-CM

## 2023-09-25 DIAGNOSIS — Z90.710 ACQUIRED ABSENCE OF BOTH CERVIX AND UTERUS: Chronic | ICD-10-CM

## 2023-09-25 DIAGNOSIS — R91.1 SOLITARY PULMONARY NODULE: ICD-10-CM

## 2023-09-25 PROCEDURE — 71250 CT THORAX DX C-: CPT

## 2023-09-25 PROCEDURE — 71250 CT THORAX DX C-: CPT | Mod: 26,MH

## 2023-09-26 DIAGNOSIS — R91.1 SOLITARY PULMONARY NODULE: ICD-10-CM

## 2024-07-01 ENCOUNTER — APPOINTMENT (OUTPATIENT)
Dept: PULMONOLOGY | Facility: CLINIC | Age: 79
End: 2024-07-01
Payer: MEDICARE

## 2024-07-01 VITALS
WEIGHT: 112 LBS | BODY MASS INDEX: 19.12 KG/M2 | DIASTOLIC BLOOD PRESSURE: 84 MMHG | SYSTOLIC BLOOD PRESSURE: 120 MMHG | OXYGEN SATURATION: 97 % | HEART RATE: 73 BPM | HEIGHT: 64 IN

## 2024-07-01 DIAGNOSIS — J47.9 BRONCHIECTASIS, UNCOMPLICATED: ICD-10-CM

## 2024-07-01 DIAGNOSIS — R09.82 POSTNASAL DRIP: ICD-10-CM

## 2024-07-01 DIAGNOSIS — J30.89 OTHER ALLERGIC RHINITIS: ICD-10-CM

## 2024-07-01 PROCEDURE — G2211 COMPLEX E/M VISIT ADD ON: CPT

## 2024-07-01 PROCEDURE — 99214 OFFICE O/P EST MOD 30 MIN: CPT

## 2024-09-17 ENCOUNTER — RX RENEWAL (OUTPATIENT)
Age: 79
End: 2024-09-17

## 2024-12-04 ENCOUNTER — APPOINTMENT (OUTPATIENT)
Dept: PULMONOLOGY | Facility: CLINIC | Age: 79
End: 2024-12-04

## 2025-02-22 ENCOUNTER — EMERGENCY (EMERGENCY)
Facility: HOSPITAL | Age: 80
LOS: 0 days | Discharge: HOME | End: 2025-02-22
Attending: EMERGENCY MEDICINE
Payer: MEDICARE

## 2025-02-22 VITALS
TEMPERATURE: 98 F | WEIGHT: 113.1 LBS | RESPIRATION RATE: 19 BRPM | OXYGEN SATURATION: 95 % | SYSTOLIC BLOOD PRESSURE: 142 MMHG | DIASTOLIC BLOOD PRESSURE: 80 MMHG | HEART RATE: 81 BPM

## 2025-02-22 DIAGNOSIS — E07.9 DISORDER OF THYROID, UNSPECIFIED: ICD-10-CM

## 2025-02-22 DIAGNOSIS — R11.0 NAUSEA: ICD-10-CM

## 2025-02-22 DIAGNOSIS — Z90.49 ACQUIRED ABSENCE OF OTHER SPECIFIED PARTS OF DIGESTIVE TRACT: Chronic | ICD-10-CM

## 2025-02-22 DIAGNOSIS — R51.9 HEADACHE, UNSPECIFIED: ICD-10-CM

## 2025-02-22 DIAGNOSIS — Z90.710 ACQUIRED ABSENCE OF BOTH CERVIX AND UTERUS: Chronic | ICD-10-CM

## 2025-02-22 PROCEDURE — 96374 THER/PROPH/DIAG INJ IV PUSH: CPT

## 2025-02-22 PROCEDURE — 99285 EMERGENCY DEPT VISIT HI MDM: CPT | Mod: FS

## 2025-02-22 PROCEDURE — 96375 TX/PRO/DX INJ NEW DRUG ADDON: CPT

## 2025-02-22 PROCEDURE — 70450 CT HEAD/BRAIN W/O DYE: CPT

## 2025-02-22 PROCEDURE — 70450 CT HEAD/BRAIN W/O DYE: CPT | Mod: 26

## 2025-02-22 PROCEDURE — 99284 EMERGENCY DEPT VISIT MOD MDM: CPT | Mod: 25

## 2025-02-22 RX ORDER — MAGNESIUM SULFATE 500 MG/ML
2 SYRINGE (ML) INJECTION ONCE
Refills: 0 | Status: COMPLETED | OUTPATIENT
Start: 2025-02-22 | End: 2025-02-22

## 2025-02-22 RX ORDER — METOCLOPRAMIDE HCL 10 MG
10 TABLET ORAL ONCE
Refills: 0 | Status: COMPLETED | OUTPATIENT
Start: 2025-02-22 | End: 2025-02-22

## 2025-02-22 RX ORDER — ACETAMINOPHEN 500 MG/5ML
650 LIQUID (ML) ORAL ONCE
Refills: 0 | Status: COMPLETED | OUTPATIENT
Start: 2025-02-22 | End: 2025-02-22

## 2025-02-22 RX ADMIN — Medication 1000 MILLILITER(S): at 14:57

## 2025-02-22 RX ADMIN — Medication 104 MILLIGRAM(S): at 14:57

## 2025-02-22 RX ADMIN — Medication 650 MILLIGRAM(S): at 16:03

## 2025-02-22 RX ADMIN — Medication 25 GRAM(S): at 14:57

## 2025-03-11 ENCOUNTER — APPOINTMENT (OUTPATIENT)
Dept: PULMONOLOGY | Facility: CLINIC | Age: 80
End: 2025-03-11
Payer: MEDICARE

## 2025-03-11 VITALS
HEART RATE: 92 BPM | OXYGEN SATURATION: 96 % | BODY MASS INDEX: 19.46 KG/M2 | SYSTOLIC BLOOD PRESSURE: 102 MMHG | HEIGHT: 64 IN | DIASTOLIC BLOOD PRESSURE: 74 MMHG | WEIGHT: 114 LBS

## 2025-03-11 DIAGNOSIS — J30.89 OTHER ALLERGIC RHINITIS: ICD-10-CM

## 2025-03-11 DIAGNOSIS — J47.9 BRONCHIECTASIS, UNCOMPLICATED: ICD-10-CM

## 2025-03-11 PROCEDURE — 99213 OFFICE O/P EST LOW 20 MIN: CPT

## 2025-03-11 PROCEDURE — G2211 COMPLEX E/M VISIT ADD ON: CPT

## 2025-07-11 ENCOUNTER — RX RENEWAL (OUTPATIENT)
Age: 80
End: 2025-07-11

## 2025-09-09 ENCOUNTER — APPOINTMENT (OUTPATIENT)
Dept: PULMONOLOGY | Facility: CLINIC | Age: 80
End: 2025-09-09
Payer: MEDICARE

## 2025-09-09 VITALS
HEIGHT: 64 IN | DIASTOLIC BLOOD PRESSURE: 64 MMHG | OXYGEN SATURATION: 96 % | HEART RATE: 73 BPM | WEIGHT: 108 LBS | SYSTOLIC BLOOD PRESSURE: 116 MMHG | BODY MASS INDEX: 18.44 KG/M2

## 2025-09-09 DIAGNOSIS — J30.89 OTHER ALLERGIC RHINITIS: ICD-10-CM

## 2025-09-09 DIAGNOSIS — R09.82 POSTNASAL DRIP: ICD-10-CM

## 2025-09-09 DIAGNOSIS — J47.9 BRONCHIECTASIS, UNCOMPLICATED: ICD-10-CM

## 2025-09-09 PROCEDURE — 99213 OFFICE O/P EST LOW 20 MIN: CPT

## 2025-09-09 PROCEDURE — G2211 COMPLEX E/M VISIT ADD ON: CPT
